# Patient Record
Sex: FEMALE | Race: WHITE
[De-identification: names, ages, dates, MRNs, and addresses within clinical notes are randomized per-mention and may not be internally consistent; named-entity substitution may affect disease eponyms.]

---

## 2018-05-31 ENCOUNTER — HOSPITAL ENCOUNTER (OUTPATIENT)
Dept: HOSPITAL 8 - CFH | Age: 48
Discharge: HOME | End: 2018-05-31
Attending: SPECIALIST
Payer: COMMERCIAL

## 2018-05-31 DIAGNOSIS — N63.21: Primary | ICD-10-CM

## 2018-05-31 DIAGNOSIS — R92.1: ICD-10-CM

## 2018-05-31 PROCEDURE — 77065 DX MAMMO INCL CAD UNI: CPT

## 2018-05-31 PROCEDURE — 76642 ULTRASOUND BREAST LIMITED: CPT

## 2018-05-31 PROCEDURE — G0279 TOMOSYNTHESIS, MAMMO: HCPCS

## 2018-07-03 ENCOUNTER — OFFICE VISIT (OUTPATIENT)
Dept: HEMATOLOGY ONCOLOGY | Facility: MEDICAL CENTER | Age: 48
End: 2018-07-03
Payer: COMMERCIAL

## 2018-07-03 ENCOUNTER — NON-PROVIDER VISIT (OUTPATIENT)
Dept: HEMATOLOGY ONCOLOGY | Facility: MEDICAL CENTER | Age: 48
End: 2018-07-03
Payer: COMMERCIAL

## 2018-07-03 VITALS
DIASTOLIC BLOOD PRESSURE: 80 MMHG | BODY MASS INDEX: 19.55 KG/M2 | HEART RATE: 89 BPM | RESPIRATION RATE: 16 BRPM | SYSTOLIC BLOOD PRESSURE: 106 MMHG | WEIGHT: 128.97 LBS | TEMPERATURE: 98.6 F | HEIGHT: 68 IN | OXYGEN SATURATION: 97 %

## 2018-07-03 VITALS
HEART RATE: 89 BPM | HEIGHT: 68 IN | TEMPERATURE: 98.6 F | SYSTOLIC BLOOD PRESSURE: 106 MMHG | DIASTOLIC BLOOD PRESSURE: 80 MMHG | OXYGEN SATURATION: 97 % | BODY MASS INDEX: 19.55 KG/M2 | WEIGHT: 128.97 LBS | RESPIRATION RATE: 16 BRPM

## 2018-07-03 DIAGNOSIS — Z17.1 MALIGNANT NEOPLASM OF UPPER-OUTER QUADRANT OF LEFT BREAST IN FEMALE, ESTROGEN RECEPTOR NEGATIVE (HCC): ICD-10-CM

## 2018-07-03 DIAGNOSIS — C50.412 MALIGNANT NEOPLASM OF UPPER-OUTER QUADRANT OF LEFT BREAST IN FEMALE, ESTROGEN RECEPTOR NEGATIVE (HCC): ICD-10-CM

## 2018-07-03 DIAGNOSIS — Z15.01 BREAST CANCER GENETIC SUSCEPTIBILITY: ICD-10-CM

## 2018-07-03 PROCEDURE — 99242 OFF/OP CONSLTJ NEW/EST SF 20: CPT | Performed by: MEDICAL GENETICS

## 2018-07-03 PROCEDURE — 36415 COLL VENOUS BLD VENIPUNCTURE: CPT | Performed by: MEDICAL GENETICS

## 2018-07-03 ASSESSMENT — PAIN SCALES - GENERAL
PAINLEVEL: NO PAIN
PAINLEVEL: NO PAIN

## 2018-07-03 NOTE — PROGRESS NOTES
Juani Garrison is a 47 y.o. female here for a non-provider visit for: Lab Draws  on 7/3/2018 at 8:48 AM    Procedure Performed: Venipuncture     Anatomical site: Right Antecubital Area (AC)    Equipment used: 21g    Labs drawn: [facility]    Ordering Provider: Dr. Jackson Mock    Landon By: Eloisa Cutler, Pascual Ass't   No complications.  was present the whole time the patient was having labs drawn.

## 2018-07-03 NOTE — PROGRESS NOTES
Juani Garrison is a 47 y.o. year old patient who was referred for cancer genetic risk assessment     Chief Complaint:   Chief Complaint   Patient presents with   • New Patient      Dx: Breast cancer       HPI: The patient was well until 3 weeks ago at which time a suspicious physical exam caused the patient to be referred for imaging . A suspicious mammogram study identified a (left) breast mass. A biopsy  ultrasound guided was performed and a specimen was submitted to pathology.     A diagnosis of  invasive ductal  carcinoma was made.   Review of personal and family histories suggested that a cancer genetic risk assessment was in order. (-x3)    Review of Systems (ROS):  Constitutional normal   Eyes normal   ENT normal    Respiratory normal   Cardiovascular normal   Gastrointestinal normal   Genitourinary normal   Musculoskeletal normal   Skin normal   Neurological normal   Endocrine normal   Psychiatric normal   Hematologic/lymphatic normal   Allergic/Immunologic  No sensitivities to medications reported  Remaining systems negative? Yes  Total review of symptoms  >10:       History (Past/Family/ROS)  Family History:    3 generation pedigree built  Yes   Tyrer-Cuzick empiric risk calculation No   Ez II empiric risk calculation  No  Social History     Yes    Social History     Social History   • Marital status: Unknown     Spouse name: N/A   • Number of children: N/A   • Years of education: N/A     Occupational History   • Not on file.     Social History Main Topics   • Smoking status: Not on file   • Smokeless tobacco: Not on file   • Alcohol use Not on file   • Drug use: Unknown   • Sexual activity: Not on file     Other Topics Concern   • Not on file     Social History Narrative   • No narrative on file       Patient Past History     Yes  History areas assessed     3:   NCCN Testing Criteria Met                            Yes    Physical Exam  Constitutional    Encounter Vitals  Standard  "Vitals  Vitals  Blood Pressure: 106/80  Temperature: 37 °C (98.6 °F)  Pulse: 89  Respiration: 16  Pulse Oximetry: 97 %  Height: 172.7 cm (5' 8\")  Weight: 58.5 kg (128 lb 15.5 oz)  Encounter Vitals  Temperature: 37 °C (98.6 °F)  Temp Source: Temporal  Blood Pressure: 106/80  BP Location: Right, Upper Arm  Patient BP Position: Sitting  Pulse: 89  Respiration: 16  Pulse Oximetry: 97 %  Weight: 58.5 kg (128 lb 15.5 oz)  Weight Source: Stand Up Scale  Height: 172.7 cm (5' 8\")  BMI (Calculated): 19.61  Pain Score: No pain  Pulmonary-Specific Vitals     Durable Medical Equipment-Specific Vitals              General appearance: normal    Head Circumference: 22 1/4 in  ([place])  Eyes    Conjunctiva: clear   Pupils: reactive to light and accomodation     ENMT    External inspection: normal   Assessment of Hearing: normal    Lips/cheeks/gums: no lesions  Neck   External exam: normal    Thyroid: no masses   Respiratory   Auscultation: clear in all fields      Cardiovascular   Auscultation: RRR with no murmers   Edema : none  Chest   Breasts (exam): not done   Palpation:   GI   External exam and palpation: normal      Pelvic (female): not done   External exam (male):   Lymphatic   Palpation in 2 areas: normal     Musculoskeletal   Gait: normal    Digits and Nails: normal   Skin   Inspection: normal   Palpation: no masses                  Fibrofolliculoma:No                  Trichodiscoma:No                   Akrochordon: No                   CAfe-au-lait: No                  Hypopigmentation: No                  Mucosal freckling No  Neurologic   Cranial nerves: intact    DTR’s: 2+ and equal  PE summary    18 bullets (of 25 total):     Problem Points   New, further evaluation planned (4)    Low  risk    DATA POINTS   >4:    PROBLEM POINTS   >4:     RISK  Low    TYPE OF MEDICAL DECISION MAKING Intermediate      30 minutes TIME (FACE TO FACE) AND DOCUMENTED  DOCUMENTATION DESCRIBE CONTENTS OF COUNSELING/CARE " COORDINATION  DOCUMENTATION SUPPORT >50% TIME SPENT IN COUNSELING/COORDINATION       680[address] (30 minutes)  with  Moderate complexity    Patient with diagnosis of breast cancer (- x 3) at age 47 and equivocal family history     Color panel    Begin: 8:00  End: 8:35

## 2019-05-03 ENCOUNTER — HOSPITAL ENCOUNTER (OUTPATIENT)
Dept: CARDIOLOGY | Facility: MEDICAL CENTER | Age: 49
End: 2019-05-03
Attending: INTERNAL MEDICINE
Payer: COMMERCIAL

## 2019-05-03 ENCOUNTER — HOSPITAL ENCOUNTER (OUTPATIENT)
Dept: LAB | Facility: MEDICAL CENTER | Age: 49
End: 2019-05-03
Attending: INTERNAL MEDICINE
Payer: COMMERCIAL

## 2019-05-03 DIAGNOSIS — Z00.6 RESEARCH STUDY PATIENT: ICD-10-CM

## 2019-05-03 LAB
ALBUMIN SERPL BCP-MCNC: 4.3 G/DL (ref 3.2–4.9)
ALBUMIN/GLOB SERPL: 2 G/DL
ALP SERPL-CCNC: 63 U/L (ref 30–99)
ALT SERPL-CCNC: 13 U/L (ref 2–50)
ANION GAP SERPL CALC-SCNC: 6 MMOL/L (ref 0–11.9)
AST SERPL-CCNC: 18 U/L (ref 12–45)
BASOPHILS # BLD AUTO: 1.2 % (ref 0–1.8)
BASOPHILS # BLD: 0.04 K/UL (ref 0–0.12)
BILIRUB SERPL-MCNC: 0.4 MG/DL (ref 0.1–1.5)
BUN SERPL-MCNC: 13 MG/DL (ref 8–22)
CALCIUM SERPL-MCNC: 9.7 MG/DL (ref 8.5–10.5)
CHLORIDE SERPL-SCNC: 104 MMOL/L (ref 96–112)
CO2 SERPL-SCNC: 28 MMOL/L (ref 20–33)
CREAT SERPL-MCNC: 0.75 MG/DL (ref 0.5–1.4)
EKG IMPRESSION: NORMAL
EOSINOPHIL # BLD AUTO: 0.03 K/UL (ref 0–0.51)
EOSINOPHIL NFR BLD: 0.9 % (ref 0–6.9)
ERYTHROCYTE [DISTWIDTH] IN BLOOD BY AUTOMATED COUNT: 44.5 FL (ref 35.9–50)
GLOBULIN SER CALC-MCNC: 2.1 G/DL (ref 1.9–3.5)
GLUCOSE SERPL-MCNC: 91 MG/DL (ref 65–99)
HCT VFR BLD AUTO: 40.6 % (ref 37–47)
HGB BLD-MCNC: 13.4 G/DL (ref 12–16)
IMM GRANULOCYTES # BLD AUTO: 0.01 K/UL (ref 0–0.11)
IMM GRANULOCYTES NFR BLD AUTO: 0.3 % (ref 0–0.9)
LYMPHOCYTES # BLD AUTO: 1.1 K/UL (ref 1–4.8)
LYMPHOCYTES NFR BLD: 34 % (ref 22–41)
MCH RBC QN AUTO: 31 PG (ref 27–33)
MCHC RBC AUTO-ENTMCNC: 33 G/DL (ref 33.6–35)
MCV RBC AUTO: 94 FL (ref 81.4–97.8)
MONOCYTES # BLD AUTO: 0.25 K/UL (ref 0–0.85)
MONOCYTES NFR BLD AUTO: 7.7 % (ref 0–13.4)
NEUTROPHILS # BLD AUTO: 1.81 K/UL (ref 2–7.15)
NEUTROPHILS NFR BLD: 55.9 % (ref 44–72)
NRBC # BLD AUTO: 0 K/UL
NRBC BLD-RTO: 0 /100 WBC
PLATELET # BLD AUTO: 205 K/UL (ref 164–446)
PMV BLD AUTO: 9.9 FL (ref 9–12.9)
POTASSIUM SERPL-SCNC: 3.8 MMOL/L (ref 3.6–5.5)
PROT SERPL-MCNC: 6.4 G/DL (ref 6–8.2)
RBC # BLD AUTO: 4.32 M/UL (ref 4.2–5.4)
SODIUM SERPL-SCNC: 138 MMOL/L (ref 135–145)
WBC # BLD AUTO: 3.2 K/UL (ref 4.8–10.8)

## 2019-05-03 PROCEDURE — 80053 COMPREHEN METABOLIC PANEL: CPT

## 2019-05-03 PROCEDURE — 93010 ELECTROCARDIOGRAM REPORT: CPT | Performed by: INTERNAL MEDICINE

## 2019-05-03 PROCEDURE — 93005 ELECTROCARDIOGRAM TRACING: CPT | Performed by: INTERNAL MEDICINE

## 2019-05-03 PROCEDURE — 85025 COMPLETE CBC W/AUTO DIFF WBC: CPT

## 2019-05-03 PROCEDURE — 36415 COLL VENOUS BLD VENIPUNCTURE: CPT

## 2021-11-20 ENCOUNTER — OFFICE VISIT (OUTPATIENT)
Dept: URGENT CARE | Facility: CLINIC | Age: 51
End: 2021-11-20
Payer: COMMERCIAL

## 2021-11-20 VITALS
RESPIRATION RATE: 16 BRPM | OXYGEN SATURATION: 96 % | DIASTOLIC BLOOD PRESSURE: 80 MMHG | TEMPERATURE: 98.4 F | BODY MASS INDEX: 19.7 KG/M2 | HEART RATE: 83 BPM | SYSTOLIC BLOOD PRESSURE: 118 MMHG | HEIGHT: 68 IN | WEIGHT: 130 LBS

## 2021-11-20 DIAGNOSIS — S61.211A LACERATION OF LEFT INDEX FINGER WITHOUT FOREIGN BODY WITHOUT DAMAGE TO NAIL, INITIAL ENCOUNTER: ICD-10-CM

## 2021-11-20 PROCEDURE — 90471 IMMUNIZATION ADMIN: CPT | Performed by: PHYSICIAN ASSISTANT

## 2021-11-20 PROCEDURE — 12002 RPR S/N/AX/GEN/TRNK2.6-7.5CM: CPT | Performed by: PHYSICIAN ASSISTANT

## 2021-11-20 PROCEDURE — 90715 TDAP VACCINE 7 YRS/> IM: CPT | Performed by: PHYSICIAN ASSISTANT

## 2021-11-20 RX ORDER — CEPHALEXIN 500 MG/1
500 CAPSULE ORAL 3 TIMES DAILY
Qty: 15 CAPSULE | Refills: 0 | Status: SHIPPED | OUTPATIENT
Start: 2021-11-20 | End: 2021-11-25

## 2021-11-20 RX ORDER — ANASTROZOLE 1 MG/1
TABLET ORAL
COMMUNITY
Start: 2021-10-14

## 2021-11-20 ASSESSMENT — ENCOUNTER SYMPTOMS
BRUISES/BLEEDS EASILY: 0
TINGLING: 0
ROS SKIN COMMENTS: LEFT INDEX FINGER LACERATION
MYALGIAS: 1
CHILLS: 0
FEVER: 0

## 2021-11-20 NOTE — PROGRESS NOTES
"Subjective:   Juani Garrison is a 51 y.o. female who presents for Laceration (left hand - index finger)      Laceration   The incident occurred 1 to 3 hours ago (Patient sustained a left index finger laceration while working with power tools this afternoon.  Able to get the bleeding controlled with compression.  Denies any loss of range of motion of the digit.). Pain location: Left index finger. The laceration is 3 cm in size. The laceration mechanism was a metal edge. The pain is mild. The pain has been constant since onset. She reports no foreign bodies present. Her tetanus status is out of date.       Review of Systems   Constitutional: Negative for chills, fever and malaise/fatigue.   Musculoskeletal: Positive for myalgias. Negative for joint pain.   Skin:        Left index finger laceration   Neurological: Negative for tingling.   Endo/Heme/Allergies: Does not bruise/bleed easily.       Medications:    • anastrozole Tabs  • cephALEXin Caps    Allergies: Patient has no allergy information on record.    Problem List: Juani Garrison does not have any pertinent problems on file.    Surgical History:  No past surgical history on file.    Past Social Hx: Juani Garrison  reports that she has never smoked. She has never used smokeless tobacco.     Past Family Hx:  Juani Garrison family history is not on file.     Problem list, medications, and allergies reviewed by myself today in Epic.     Objective:     /80 (BP Location: Right arm, Patient Position: Sitting, BP Cuff Size: Adult)   Pulse 83   Temp 36.9 °C (98.4 °F) (Temporal)   Resp 16   Ht 1.727 m (5' 8\")   Wt 59 kg (130 lb)   SpO2 96%   BMI 19.77 kg/m²     Physical Exam  Constitutional:       General: She is not in acute distress.     Appearance: Normal appearance. She is not ill-appearing, toxic-appearing or diaphoretic.   HENT:      Head: Normocephalic and atraumatic.   Eyes:      Conjunctiva/sclera: Conjunctivae normal.   Cardiovascular:      " Rate and Rhythm: Normal rate and regular rhythm.   Pulmonary:      Effort: Pulmonary effort is normal.   Musculoskeletal:        Hands:       Cervical back: Normal range of motion.      Comments: Left index finger: 3 cm curved laceration to the radial aspect traversing the PIP joint.  Laceration is fairly superficial with a flap tear of the skin.  No underlying neurovascular damage.  No foreign bodies present.  Patient has full flexion and extension of the MCP PIP and DIP joint.  Sensation is intact distally.  Capillary refill less than 2 seconds.   Skin:     Capillary Refill: Capillary refill takes less than 2 seconds.   Neurological:      General: No focal deficit present.      Mental Status: She is alert and oriented to person, place, and time. Mental status is at baseline.           Assessment/Plan:     Comments/MDM:     Procedure: Laceration Repair  Risks including bleeding, nerve damage, infection, and poor cosmetic outcome discussed. Benefits and alternatives discussed.   Clean technique with sterile instruments and suture used  Digital block with with 2% lidocaine.  3 cc used  Closed with #3  5-0 Nylon interrupted sutures with good wound approximation  Polysporin and dressing placed  Patient tolerated well  Tetanus updated in clinic.  Take oral antibiotics as prescribed for the next 5 days.  Follow-up in clinic in 10 days for suture removal.  Sooner for any signs of infection.  Wound care instructions discussed.     Diagnosis and associated orders:     1. Laceration of left index finger without foreign body without damage to nail, initial encounter  Tdap =>8yo IM    cephALEXin (KEFLEX) 500 MG Cap            Differential diagnosis, natural history, supportive care, and indications for immediate follow-up discussed.    Advised the patient to follow-up with the primary care physician for recheck, reevaluation, and consideration of further management.    Please note that this dictation was created using voice  recognition software. I have made reasonable attempt to correct obvious errors, but I expect that there are errors of grammar and possibly content that I did not discover before finalizing the note.    This note was electronically signed by ISRA Flores PA-C